# Patient Record
Sex: FEMALE | Race: WHITE | NOT HISPANIC OR LATINO | URBAN - METROPOLITAN AREA
[De-identification: names, ages, dates, MRNs, and addresses within clinical notes are randomized per-mention and may not be internally consistent; named-entity substitution may affect disease eponyms.]

---

## 2023-05-22 ENCOUNTER — PATIENT OUTREACH (OUTPATIENT)
Dept: CARE COORDINATION | Facility: CLINIC | Age: 68
End: 2023-05-22
Payer: MEDICARE

## 2023-07-10 LAB
ANION GAP IN SER/PLAS: 10 MMOL/L (ref 10–20)
CALCIUM (MG/DL) IN SER/PLAS: 9.4 MG/DL (ref 8.6–10.3)
CARBON DIOXIDE, TOTAL (MMOL/L) IN SER/PLAS: 30 MMOL/L (ref 21–32)
CHLORIDE (MMOL/L) IN SER/PLAS: 101 MMOL/L (ref 98–107)
CREATININE (MG/DL) IN SER/PLAS: 0.7 MG/DL (ref 0.5–1.05)
GFR FEMALE: >90 ML/MIN/1.73M2
GLUCOSE (MG/DL) IN SER/PLAS: 95 MG/DL (ref 74–99)
POTASSIUM (MMOL/L) IN SER/PLAS: 3.9 MMOL/L (ref 3.5–5.3)
SODIUM (MMOL/L) IN SER/PLAS: 137 MMOL/L (ref 136–145)
UREA NITROGEN (MG/DL) IN SER/PLAS: 13 MG/DL (ref 6–23)

## 2023-10-11 ENCOUNTER — ANCILLARY PROCEDURE (OUTPATIENT)
Dept: RADIOLOGY | Facility: CLINIC | Age: 68
End: 2023-10-11
Payer: MEDICARE

## 2023-10-11 DIAGNOSIS — Z12.31 ENCOUNTER FOR SCREENING MAMMOGRAM FOR MALIGNANT NEOPLASM OF BREAST: ICD-10-CM

## 2023-10-11 PROCEDURE — 77063 BREAST TOMOSYNTHESIS BI: CPT | Mod: 50

## 2023-10-11 PROCEDURE — 77067 SCR MAMMO BI INCL CAD: CPT | Mod: BILATERAL PROCEDURE | Performed by: RADIOLOGY

## 2023-10-11 PROCEDURE — 77063 BREAST TOMOSYNTHESIS BI: CPT | Mod: BILATERAL PROCEDURE | Performed by: RADIOLOGY

## 2023-10-11 PROCEDURE — 77067 SCR MAMMO BI INCL CAD: CPT

## 2024-05-22 DIAGNOSIS — I48.0 PAROXYSMAL ATRIAL FIBRILLATION (MULTI): ICD-10-CM

## 2024-05-22 RX ORDER — VIT C/E/ZN/COPPR/LUTEIN/ZEAXAN 250MG-90MG
1000 CAPSULE ORAL
COMMUNITY
Start: 2015-09-15

## 2024-05-22 RX ORDER — RIVAROXABAN 20 MG/1
20 TABLET, FILM COATED ORAL DAILY
COMMUNITY

## 2024-05-22 RX ORDER — POTASSIUM CHLORIDE 750 MG/1
10 TABLET, EXTENDED RELEASE ORAL DAILY
COMMUNITY

## 2024-05-22 RX ORDER — POTASSIUM CHLORIDE 750 MG/1
10 TABLET, EXTENDED RELEASE ORAL DAILY
Qty: 90 TABLET | Refills: 3 | Status: SHIPPED | OUTPATIENT
Start: 2024-05-22

## 2024-05-22 RX ORDER — METOPROLOL SUCCINATE 25 MG/1
25 TABLET, EXTENDED RELEASE ORAL DAILY
COMMUNITY

## 2024-05-22 RX ORDER — FLUOROMETHOLONE 1 MG/ML
SUSPENSION/ DROPS OPHTHALMIC
COMMUNITY
Start: 2024-02-26

## 2024-05-22 RX ORDER — BIOTIN 10 MG
TABLET ORAL
COMMUNITY

## 2024-06-19 DIAGNOSIS — I48.0 PAROXYSMAL ATRIAL FIBRILLATION (MULTI): ICD-10-CM

## 2024-06-19 RX ORDER — RIVAROXABAN 20 MG/1
20 TABLET, FILM COATED ORAL DAILY
Qty: 90 TABLET | Refills: 3 | Status: SHIPPED | OUTPATIENT
Start: 2024-06-19

## 2024-07-19 ENCOUNTER — APPOINTMENT (OUTPATIENT)
Dept: CARDIOLOGY | Facility: CLINIC | Age: 69
End: 2024-07-19
Payer: MEDICARE

## 2024-07-19 VITALS
DIASTOLIC BLOOD PRESSURE: 70 MMHG | HEART RATE: 64 BPM | SYSTOLIC BLOOD PRESSURE: 120 MMHG | WEIGHT: 114 LBS | BODY MASS INDEX: 20.2 KG/M2 | HEIGHT: 63 IN

## 2024-07-19 DIAGNOSIS — I48.0 PAROXYSMAL ATRIAL FIBRILLATION (MULTI): Primary | ICD-10-CM

## 2024-07-19 DIAGNOSIS — E87.6 HYPOKALEMIA: ICD-10-CM

## 2024-07-19 PROCEDURE — 99214 OFFICE O/P EST MOD 30 MIN: CPT | Performed by: INTERNAL MEDICINE

## 2024-07-19 PROCEDURE — 1036F TOBACCO NON-USER: CPT | Performed by: INTERNAL MEDICINE

## 2024-07-19 PROCEDURE — 3008F BODY MASS INDEX DOCD: CPT | Performed by: INTERNAL MEDICINE

## 2024-07-19 PROCEDURE — 1159F MED LIST DOCD IN RCRD: CPT | Performed by: INTERNAL MEDICINE

## 2024-07-19 NOTE — PATIENT INSTRUCTIONS

## 2024-07-19 NOTE — PROGRESS NOTES
"Chief Complaint:   Please see below.     History Of Present Illness:    Emeli Mae is a 68 y.o. female presenting with PAF.    This 68 year old woman has anticardiolipin antibody, for which she is on chronic anticoagulation, per hematology. She also has a history of atrial tachycardia, and paroxysmal atrial fibrillation that seems to occur in the setting of hypokalemia. She has a [id number] score of 2 placing her at high risk for stroke and thromboembolism, Her prior stress echo in February 2019 was negative for ischemia by wall motion criteria at 8.5 METS. She has seen an electrophysiologist, Dr. Osvaldo Ruggiero at [facility] most recently in January 2022, at which time continued anticoagulation and conservative management were recommended.     She experiences palpitations rarely, less than once a month, lasting for seconds.  The patient denies chest discomfort, dyspnea,  orthopnea, PND, syncope, and near syncope.  She exercises on a treadmill for an hour, 5-6 days per week, and feels well when she exercises.  The patient denies bleeding problems on anticoagulation.       Last Recorded Vitals:  Vitals:    07/19/24 0900   BP: 120/70   BP Location: Left arm   Patient Position: Sitting   Pulse: 64   Weight: 51.7 kg (114 lb)   Height: 1.6 m (5' 3\")       Past Medical History:  She has a past medical history of Other diseases of the blood and blood-forming organs and certain disorders involving the immune mechanism complicating pregnancy, unspecified trimester ([facility]) (07/09/2021).    Past Surgical History:  She has no past surgical history on file.      Social History:  She reports that she has never smoked. She has never used smokeless tobacco. She reports current alcohol use. She reports that she does not use drugs.    Family History:  Family History   Problem Relation Name Age of Onset    No Known Problems Mother      No Known Problems Father      Hypertrophic cardiomyopathy Child son     Other (ICD) Child son  " "       Allergies:  Patient has no known allergies.    Outpatient Medications:  Current Outpatient Medications   Medication Instructions    BACILLUS COAGULANS-INULIN ORAL 1 capsule, oral, Daily, ( Probiotic )    cholecalciferol (VITAMIN D-3) 1,000 Units, oral, Daily RT    metoprolol succinate XL (TOPROL-XL) 25 mg, oral, Daily    multivitamin,tx-iron-minerals (Complete Multivitamin) tablet oral    potassium chloride CR 10 mEq ER tablet 10 mEq, oral, Daily, Take with food.    Xarelto 20 mg, oral, Daily       Physical Exam:  GENERAL:  pleasant 68 year-old  HEENT: No xanthelasma  NECK: Supple, no palpable adenopathy or thyromegaly  CHEST: Clear to auscultation, respiratory effort unlabored  CARDIAC: RRR, normal S1 and S2, no audible murmur, rub, gallop, carotids are brisk, PMI is not displaced  ABD: Active bowel sounds, nontender, no organomegaly, no evidence of ascites  EXT: No clubbing, cyanosis, edema, or tenderness  NEURO: Awake, alert, appropriate, speech is fluent       Last Labs:  CBC -  Lab Results   Component Value Date    WBC 8.8 05/17/2023    HGB 13.7 05/17/2023    HCT 40.8 05/17/2023    MCV 84 05/17/2023     05/17/2023       CMP -  Lab Results   Component Value Date    CALCIUM 9.4 07/10/2023    PROT 7.2 05/17/2023    ALBUMIN 4.6 05/17/2023    AST 19 05/17/2023    ALT 18 05/17/2023    ALKPHOS 71 05/17/2023    BILITOT 0.4 05/17/2023       LIPID PANEL -   No results found for: \"CHOL\", \"TRIG\", \"HDL\", \"CHHDL\", \"LDLF\", \"VLDL\", \"NHDL\"    RENAL FUNCTION PANEL -   Lab Results   Component Value Date    GLUCOSE 95 07/10/2023     07/10/2023    K 3.9 07/10/2023     07/10/2023    CO2 30 07/10/2023    ANIONGAP 10 07/10/2023    BUN 13 07/10/2023    CREATININE 0.70 07/10/2023    CALCIUM 9.4 07/10/2023    ALBUMIN 4.6 05/17/2023        Lab Results   Component Value Date    BNP 64 05/17/2023    BNP CANCELED 05/17/2023         No recent results to review    Assessment/Plan   Assessment & Plan  Paroxysmal atrial " fibrillation (Multi)  We discussed the things that the patient can do  to avoid future recurrences of atrial fibrillation which include: avoidance of alcohol.  Risk factor modification: educational materials were provided to the patient.   Orders:    Comprehensive metabolic panel; Future    CBC; Future    Follow Up In Cardiology; Future    Hypokalemia  Discussed the correlation between her arrhythmias and hypokalemia, will recheck labs.             Lj Curtis MD

## 2024-07-19 NOTE — ASSESSMENT & PLAN NOTE
We discussed the things that the patient can do  to avoid future recurrences of atrial fibrillation which include: avoidance of alcohol.  Risk factor modification: educational materials were provided to the patient.   Orders:    Comprehensive metabolic panel; Future    CBC; Future    Follow Up In Cardiology; Future

## 2024-07-26 ENCOUNTER — LAB (OUTPATIENT)
Dept: LAB | Facility: LAB | Age: 69
End: 2024-07-26
Payer: MEDICARE

## 2024-07-26 DIAGNOSIS — I48.0 PAROXYSMAL ATRIAL FIBRILLATION (MULTI): ICD-10-CM

## 2024-07-26 LAB
ALBUMIN SERPL BCP-MCNC: 4.5 G/DL (ref 3.4–5)
ALP SERPL-CCNC: 75 U/L (ref 33–136)
ALT SERPL W P-5'-P-CCNC: 21 U/L (ref 7–45)
ANION GAP SERPL CALC-SCNC: 12 MMOL/L (ref 10–20)
AST SERPL W P-5'-P-CCNC: 23 U/L (ref 9–39)
BILIRUB SERPL-MCNC: 0.5 MG/DL (ref 0–1.2)
BUN SERPL-MCNC: 15 MG/DL (ref 6–23)
CALCIUM SERPL-MCNC: 9.4 MG/DL (ref 8.6–10.3)
CHLORIDE SERPL-SCNC: 102 MMOL/L (ref 98–107)
CO2 SERPL-SCNC: 28 MMOL/L (ref 21–32)
CREAT SERPL-MCNC: 0.76 MG/DL (ref 0.5–1.05)
EGFRCR SERPLBLD CKD-EPI 2021: 86 ML/MIN/1.73M*2
ERYTHROCYTE [DISTWIDTH] IN BLOOD BY AUTOMATED COUNT: 12.7 % (ref 11.5–14.5)
GLUCOSE SERPL-MCNC: 110 MG/DL (ref 74–99)
HCT VFR BLD AUTO: 42.2 % (ref 36–46)
HGB BLD-MCNC: 13.7 G/DL (ref 12–16)
MCH RBC QN AUTO: 28 PG (ref 26–34)
MCHC RBC AUTO-ENTMCNC: 32.5 G/DL (ref 32–36)
MCV RBC AUTO: 86 FL (ref 80–100)
NRBC BLD-RTO: 0 /100 WBCS (ref 0–0)
PLATELET # BLD AUTO: 297 X10*3/UL (ref 150–450)
POTASSIUM SERPL-SCNC: 4 MMOL/L (ref 3.5–5.3)
PROT SERPL-MCNC: 6.9 G/DL (ref 6.4–8.2)
RBC # BLD AUTO: 4.89 X10*6/UL (ref 4–5.2)
SODIUM SERPL-SCNC: 138 MMOL/L (ref 136–145)
WBC # BLD AUTO: 6.4 X10*3/UL (ref 4.4–11.3)

## 2024-07-26 PROCEDURE — 80053 COMPREHEN METABOLIC PANEL: CPT

## 2024-07-26 PROCEDURE — 85027 COMPLETE CBC AUTOMATED: CPT

## 2024-07-26 PROCEDURE — 36415 COLL VENOUS BLD VENIPUNCTURE: CPT

## 2024-08-18 DIAGNOSIS — I48.0 PAROXYSMAL ATRIAL FIBRILLATION (MULTI): ICD-10-CM

## 2024-08-19 RX ORDER — METOPROLOL SUCCINATE 25 MG/1
25 TABLET, EXTENDED RELEASE ORAL DAILY
Qty: 90 TABLET | Refills: 3 | Status: SHIPPED | OUTPATIENT
Start: 2024-08-19

## 2024-10-04 ENCOUNTER — APPOINTMENT (OUTPATIENT)
Dept: PRIMARY CARE | Facility: CLINIC | Age: 69
End: 2024-10-04
Payer: MEDICARE

## 2024-10-04 VITALS
WEIGHT: 113.2 LBS | SYSTOLIC BLOOD PRESSURE: 116 MMHG | HEIGHT: 63 IN | DIASTOLIC BLOOD PRESSURE: 70 MMHG | HEART RATE: 61 BPM | BODY MASS INDEX: 20.06 KG/M2 | OXYGEN SATURATION: 97 %

## 2024-10-04 DIAGNOSIS — Z00.00 HEALTH CARE MAINTENANCE: ICD-10-CM

## 2024-10-04 DIAGNOSIS — Z78.0 POST-MENOPAUSAL: ICD-10-CM

## 2024-10-04 DIAGNOSIS — D68.61 ANTIPHOSPHOLIPID SYNDROME (MULTI): ICD-10-CM

## 2024-10-04 DIAGNOSIS — I48.91 ATRIAL FIBRILLATION, UNSPECIFIED TYPE (MULTI): Primary | ICD-10-CM

## 2024-10-04 DIAGNOSIS — Z12.31 ENCOUNTER FOR SCREENING MAMMOGRAM FOR MALIGNANT NEOPLASM OF BREAST: ICD-10-CM

## 2024-10-04 PROCEDURE — 99214 OFFICE O/P EST MOD 30 MIN: CPT | Performed by: INTERNAL MEDICINE

## 2024-10-04 PROCEDURE — 3008F BODY MASS INDEX DOCD: CPT | Performed by: INTERNAL MEDICINE

## 2024-10-04 PROCEDURE — 1159F MED LIST DOCD IN RCRD: CPT | Performed by: INTERNAL MEDICINE

## 2024-10-04 ASSESSMENT — PATIENT HEALTH QUESTIONNAIRE - PHQ9
SUM OF ALL RESPONSES TO PHQ9 QUESTIONS 1 AND 2: 0
1. LITTLE INTEREST OR PLEASURE IN DOING THINGS: NOT AT ALL
2. FEELING DOWN, DEPRESSED OR HOPELESS: NOT AT ALL

## 2024-10-04 NOTE — PROGRESS NOTES
"Subjective   Patient ID: [id number]     Emeli Mae is a 69 y.o. female who presents for New Patient Visit.    Current Outpatient Medications:     BACILLUS COAGULANS-INULIN ORAL, Take 1 capsule by mouth once daily. ( Probiotic ), Disp: , Rfl:     cholecalciferol (Vitamin D-3) 25 MCG (1000 UT) capsule, Take 1 capsule (25 mcg) by mouth once daily., Disp: , Rfl:     metoprolol succinate XL (Toprol-XL) 25 mg 24 hr tablet, TAKE 1 TABLET BY MOUTH EVERY DAY, Disp: 90 tablet, Rfl: 3    multivitamin,tx-iron-minerals (Complete Multivitamin) tablet, Take by mouth., Disp: , Rfl:     potassium chloride CR 10 mEq ER tablet, TAKE 1 TABLET BY MOUTH EVERY DAY WITH FOOD, Disp: 90 tablet, Rfl: 3    Xarelto 20 mg tablet, TAKE 1 TABLET BY MOUTH EVERY DAY, Disp: 90 tablet, Rfl: 3  HPI  69-year-old patient presented to the office today to establish care.  Patient will need to schedule her annual Medicare wellness visit and annual exam in the near future.  Patient is known to have history of atrial fibrillation actively followed by cardiology rate controlled and anticoagulated she feels great she has no specific complaints or concerns today.  Review of system was reviewed all normal except what is noted in HPI   Past Medical History:   Diagnosis Date    Other diseases of the blood and blood-forming organs and certain disorders involving the immune mechanism complicating pregnancy, unspecified trimester ([facility]) 07/09/2021    Antiphospholipid syndrome in pregnancy      Objective   /70 (BP Location: Left arm, Patient Position: Sitting, BP Cuff Size: Adult)   Pulse 61   Ht 1.6 m (5' 3\")   Wt 51.3 kg (113 lb 3.2 oz)   SpO2 97%   BMI 20.05 kg/m²      Physical Exam  Constitutional:       Appearance: Normal appearance.   Cardiovascular:      Rate and Rhythm: Normal rate and regular rhythm.      Pulses: Normal pulses.      Heart sounds: Normal heart sounds.   Pulmonary:      Effort: Pulmonary effort is normal.      Breath sounds: " Normal breath sounds.   Neurological:      Mental Status: She is alert.         Assessment/Plan   Problem List Items Addressed This Visit    None  Visit Diagnoses       Encounter for screening mammogram for malignant neoplasm of breast        Relevant Orders    BI mammo bilateral screening tomosynthesis          69-year-old patient with the following issues.    1.  Atrial fibrillation currently rate controlled and anticoagulated the plan is to continue no changes.    2.  I will see the patient back in the office for her annual exam and Medicare wellness visit and sooner if needed.  Lab work is requested and mammogram and DEXA scan are requested.    No other active issues or concerns during this visit.  Becky Rouse MD

## 2024-10-08 ENCOUNTER — APPOINTMENT (OUTPATIENT)
Dept: RADIOLOGY | Facility: CLINIC | Age: 69
End: 2024-10-08
Payer: MEDICARE

## 2024-10-10 ENCOUNTER — APPOINTMENT (OUTPATIENT)
Dept: RADIOLOGY | Facility: CLINIC | Age: 69
End: 2024-10-10
Payer: MEDICARE

## 2024-10-17 ENCOUNTER — HOSPITAL ENCOUNTER (OUTPATIENT)
Dept: RADIOLOGY | Facility: CLINIC | Age: 69
Discharge: HOME | End: 2024-10-17
Payer: MEDICARE

## 2024-10-17 DIAGNOSIS — Z78.0 POST-MENOPAUSAL: ICD-10-CM

## 2024-10-17 PROCEDURE — 77080 DXA BONE DENSITY AXIAL: CPT

## 2024-10-22 ENCOUNTER — TELEPHONE (OUTPATIENT)
Dept: PRIMARY CARE | Facility: CLINIC | Age: 69
End: 2024-10-22
Payer: MEDICARE

## 2024-10-22 NOTE — TELEPHONE ENCOUNTER
Pt needs to see if Dr Bryant would like to add her on for a sooner appt. She has her mcw set up for 1/31/25. Please advise if pt needs something sooner per Dr Lau's msg on her Dexa Scan results. Thank you!

## 2024-10-22 NOTE — TELEPHONE ENCOUNTER
----- Message from Kenneth Lau sent at 10/22/2024  1:02 PM EDT -----  Please call patient to set up appointment with Dr. Bryant so to discuss bone density results

## 2024-11-07 ENCOUNTER — HOSPITAL ENCOUNTER (OUTPATIENT)
Dept: RADIOLOGY | Facility: CLINIC | Age: 69
Discharge: HOME | End: 2024-11-07
Payer: MEDICARE

## 2024-11-07 VITALS — BODY MASS INDEX: 19.49 KG/M2 | WEIGHT: 110 LBS | HEIGHT: 63 IN

## 2024-11-07 DIAGNOSIS — Z12.31 ENCOUNTER FOR SCREENING MAMMOGRAM FOR MALIGNANT NEOPLASM OF BREAST: ICD-10-CM

## 2024-11-07 PROCEDURE — 77067 SCR MAMMO BI INCL CAD: CPT

## 2024-11-08 ENCOUNTER — APPOINTMENT (OUTPATIENT)
Dept: PRIMARY CARE | Facility: CLINIC | Age: 69
End: 2024-11-08
Payer: MEDICARE

## 2024-11-08 VITALS
HEIGHT: 63 IN | OXYGEN SATURATION: 95 % | HEART RATE: 62 BPM | WEIGHT: 113.6 LBS | SYSTOLIC BLOOD PRESSURE: 120 MMHG | DIASTOLIC BLOOD PRESSURE: 80 MMHG | BODY MASS INDEX: 20.13 KG/M2

## 2024-11-08 DIAGNOSIS — M81.0 AGE-RELATED OSTEOPOROSIS WITHOUT CURRENT PATHOLOGICAL FRACTURE: Primary | ICD-10-CM

## 2024-11-08 PROCEDURE — 99213 OFFICE O/P EST LOW 20 MIN: CPT | Performed by: INTERNAL MEDICINE

## 2024-11-08 PROCEDURE — G2211 COMPLEX E/M VISIT ADD ON: HCPCS | Performed by: INTERNAL MEDICINE

## 2024-11-08 PROCEDURE — 1159F MED LIST DOCD IN RCRD: CPT | Performed by: INTERNAL MEDICINE

## 2024-11-08 PROCEDURE — 3008F BODY MASS INDEX DOCD: CPT | Performed by: INTERNAL MEDICINE

## 2024-11-08 ASSESSMENT — PATIENT HEALTH QUESTIONNAIRE - PHQ9
2. FEELING DOWN, DEPRESSED OR HOPELESS: NOT AT ALL
1. LITTLE INTEREST OR PLEASURE IN DOING THINGS: NOT AT ALL
SUM OF ALL RESPONSES TO PHQ9 QUESTIONS 1 AND 2: 0

## 2024-11-08 NOTE — PROGRESS NOTES
"Subjective   Patient ID: [id number]     Emeli Mae is a 69 y.o. female who presents for Results (Review bone density results).    Current Outpatient Medications:     BACILLUS COAGULANS-INULIN ORAL, Take 1 capsule by mouth once daily. ( Probiotic ), Disp: , Rfl:     cholecalciferol (Vitamin D-3) 25 MCG (1000 UT) capsule, Take 1 capsule (25 mcg) by mouth once daily., Disp: , Rfl:     metoprolol succinate XL (Toprol-XL) 25 mg 24 hr tablet, TAKE 1 TABLET BY MOUTH EVERY DAY, Disp: 90 tablet, Rfl: 3    multivitamin,tx-iron-minerals (Complete Multivitamin) tablet, Take by mouth., Disp: , Rfl:     potassium chloride CR 10 mEq ER tablet, TAKE 1 TABLET BY MOUTH EVERY DAY WITH FOOD, Disp: 90 tablet, Rfl: 3    Xarelto 20 mg tablet, TAKE 1 TABLET BY MOUTH EVERY DAY, Disp: 90 tablet, Rfl: 3  HPI  69-year-old patient who presented to the office today for follow-up on her bone mineral density and to discuss results.  She has no complaints or concerns.  Review of system was reviewed all normal except what is noted in HPI   Past Medical History:   Diagnosis Date    Other diseases of the blood and blood-forming organs and certain disorders involving the immune mechanism complicating pregnancy, unspecified trimester ([facility]) 07/09/2021    Antiphospholipid syndrome in pregnancy      Objective   /80 (BP Location: Left arm, Patient Position: Sitting, BP Cuff Size: Adult)   Pulse 62   Ht 1.6 m (5' 3\")   Wt 51.5 kg (113 lb 9.6 oz)   SpO2 95%   BMI 20.12 kg/m²      Physical Exam  Constitutional:       Appearance: Normal appearance.   Cardiovascular:      Rate and Rhythm: Normal rate and regular rhythm.      Pulses: Normal pulses.      Heart sounds: Normal heart sounds.   Pulmonary:      Effort: Pulmonary effort is normal.      Breath sounds: Normal breath sounds.   Neurological:      Mental Status: She is alert.         Assessment/Plan   Problem List Items Addressed This Visit    None  Visit Diagnoses       Age-related " osteoporosis without current pathological fracture    -  Primary    Relevant Orders    Referral to Rheumatology          69-year-old patient with the following issues.    1.  Evidence of osteoporosis on bone mineral density.  Today we did discuss the importance of taking calcium and vitamin D supplement to meet the dose of requirement of 1200 of calcium and 1000 international unit of vitamin D along with bisphosphonates we did discuss the use of Fosamax once weekly.  We went over the side effects and the contraindications which she does not have.  Patient was a little bit hesitant to start medications and she wanted to discuss this further with the specialist referral to rheumatology was provided.    Patient was encouraged to continue with exercise and core strengthening exercises and walking to improve her bone health.    All her questions were addressed no other active issues or concerns during this visit.  Becky Rouse MD

## 2025-01-07 ENCOUNTER — APPOINTMENT (OUTPATIENT)
Dept: RHEUMATOLOGY | Facility: CLINIC | Age: 70
End: 2025-01-07
Payer: MEDICARE

## 2025-01-31 ENCOUNTER — APPOINTMENT (OUTPATIENT)
Dept: PRIMARY CARE | Facility: CLINIC | Age: 70
End: 2025-01-31
Payer: MEDICARE

## 2025-02-06 ENCOUNTER — APPOINTMENT (OUTPATIENT)
Dept: RHEUMATOLOGY | Facility: CLINIC | Age: 70
End: 2025-02-06
Payer: MEDICARE

## 2025-02-27 ENCOUNTER — APPOINTMENT (OUTPATIENT)
Dept: OPHTHALMOLOGY | Facility: CLINIC | Age: 70
End: 2025-02-27
Payer: MEDICARE

## 2025-03-07 ENCOUNTER — APPOINTMENT (OUTPATIENT)
Dept: PRIMARY CARE | Facility: CLINIC | Age: 70
End: 2025-03-07
Payer: MEDICARE

## 2025-03-10 ENCOUNTER — OFFICE VISIT (OUTPATIENT)
Dept: URGENT CARE | Age: 70
End: 2025-03-10
Payer: MEDICARE

## 2025-03-10 DIAGNOSIS — I48.0 PAROXYSMAL ATRIAL FIBRILLATION (MULTI): ICD-10-CM

## 2025-03-10 DIAGNOSIS — L30.9 ECZEMA OF FACE: Primary | ICD-10-CM

## 2025-03-10 DIAGNOSIS — E87.6 HYPOKALEMIA: Primary | ICD-10-CM

## 2025-03-10 PROCEDURE — 99204 OFFICE O/P NEW MOD 45 MIN: CPT | Performed by: NURSE PRACTITIONER

## 2025-03-10 PROCEDURE — 1036F TOBACCO NON-USER: CPT | Performed by: NURSE PRACTITIONER

## 2025-03-10 PROCEDURE — 1159F MED LIST DOCD IN RCRD: CPT | Performed by: NURSE PRACTITIONER

## 2025-03-10 PROCEDURE — 1160F RVW MEDS BY RX/DR IN RCRD: CPT | Performed by: NURSE PRACTITIONER

## 2025-03-10 RX ORDER — TRIAMCINOLONE ACETONIDE 1 MG/G
OINTMENT TOPICAL
Qty: 80 G | Refills: 0 | Status: SHIPPED | OUTPATIENT
Start: 2025-03-10

## 2025-03-10 ASSESSMENT — ENCOUNTER SYMPTOMS
CONSTITUTIONAL NEGATIVE: 1
GASTROINTESTINAL NEGATIVE: 1
NEUROLOGICAL NEGATIVE: 1
PSYCHIATRIC NEGATIVE: 1
CARDIOVASCULAR NEGATIVE: 1
EYES NEGATIVE: 1
ALLERGIC/IMMUNOLOGIC NEGATIVE: 1
RESPIRATORY NEGATIVE: 1
ENDOCRINE NEGATIVE: 1
MUSCULOSKELETAL NEGATIVE: 1
HEMATOLOGIC/LYMPHATIC NEGATIVE: 1

## 2025-03-10 NOTE — PROGRESS NOTES
Urgent Care Virtual Video Visit    Patient Location: [facility]   Patient ID: Emeli Mae is a 69 y.o. female. They present today with a chief complaint of No chief complaint on file..    History of Present Illness  Patient is a 70 y/o female presenting for virtual examination with c/o facial eczema flare-up x1 day. Patient denies use of new soaps/conditions/shampoos/detergents or ingestion of new foods/drinks. Patient denies symptoms of facial/lip/glossal/throat swelling, fever, chills, bodyaches, lethargy, weakness, chest pain/tightness/pressure, SOB, wheezing, N/V/D, ABD pain. No OTC medication reported to be taken.           Past Medical History  Allergies as of 03/10/2025    (No Known Allergies)       (Not in a hospital admission)       Past Medical History:   Diagnosis Date    Other diseases of the blood and blood-forming organs and certain disorders involving the immune mechanism complicating pregnancy, unspecified trimester ([facility]) 07/09/2021    Antiphospholipid syndrome in pregnancy       History reviewed. No pertinent surgical history.     reports that she has never smoked. She has never used smokeless tobacco. She reports current alcohol use. She reports that she does not use drugs.    Review of Systems  Review of Systems   Constitutional: Negative.    HENT: Negative.     Eyes: Negative.    Respiratory: Negative.     Cardiovascular: Negative.    Gastrointestinal: Negative.    Endocrine: Negative.    Genitourinary: Negative.    Musculoskeletal: Negative.    Skin:  Positive for rash.   Allergic/Immunologic: Negative.    Neurological: Negative.    Hematological: Negative.    Psychiatric/Behavioral: Negative.                                    Objective    There were no vitals filed for this visit.  No LMP recorded. Patient is postmenopausal.    Physical Exam  Constitutional:       Comments: Patient A/O x4, LOC 5, calm and cooperative. Patient speaking in complete sentences and following commands  appropriately. Patient in no acute distress during virtual examination   HENT:      Head: Normocephalic and atraumatic.      Nose: Nose normal.   Eyes:      Extraocular Movements: Extraocular movements intact.      Conjunctiva/sclera: Conjunctivae normal.      Pupils: Pupils are equal, round, and reactive to light.   Pulmonary:      Comments: No audible cough during examination. Patient speaking in complete sentences without SOB or difficulty. Patient in no acute respiratory distress   Skin:     Comments: Multiple erythematous patches of dry-flaking tissue present to bilateral cheek regions. Patient reports no open tissue, pustules, exudates. No tenderness or warmth to palpation. All other visible skin intact   Neurological:      General: No focal deficit present.      Mental Status: She is oriented to person, place, and time.   Psychiatric:         Mood and Affect: Mood normal.         Behavior: Behavior normal.         Procedures    Point of Care Test & Imaging Results from this visit  No results found for this visit on 03/10/25.   No results found.    Diagnostic study results (if any) were reviewed by STEVEN Bryan.    Assessment/Plan   Allergies, medications, history, and pertinent labs/EKGs/Imaging reviewed by STEVEN Bryan.     Medical Decision Making  -Patient presenting to virtual examination with eczema flare-up to bilateral cheeks.   -Discussed risks vs benefits of topical steroid application to face; patient reports she is willing to try  -Emmollient encouraged  -Will refer patient to  dermatology for follow up and further treatment  -Discussed virtual examination limitations along with red flag s/s to which would dictate ED evaluation  -Stable upon discharge    I have communicated my name and active licensure information to the patient. The patient's identity and physical location were verified at the time of this visit. Either the patient or their legal representative were informed of  the risks and benefits of, and alternatives to, treatment through a remote evaluation. The patient consented to proceed with the evaluation remotely. This remote visit was conducted using video and audio.     At time of discharge, patient was clinically well-appearing and appropriate for outpatient management. The patient/parent/guardian was educated regarding diagnosis, supportive care, OTC and Rx medications. The patient/parent/guardian was given the opportunity to ask questions prior to discharge. They verbalized understanding of discussion of treatment plan, expected course of illness and/or injury, indications on when to return to , when to seek further evaluation in ED/call 911, and the need to follow up with PCP and/or specialist as referred. Patient/parent/guardian was provided with work/school documentation if requested. Patient stable upon discharge.     Orders and Diagnoses  Diagnoses and all orders for this visit:  Eczema of face  -     triamcinolone (Kenalog) 0.1 % ointment; Apply a thin film topically to affected skin areas twice daily as needed      Medical Admin Record      Patient disposition: Home    Electronically signed by STEVEN Bryan  12:16 PM      Provider Location: [facility]    Video visit completed with realtime synchronous video/audio connection. Informed consent was obtained from the patient. Patient was made aware that my evaluation and diagnosis are limited due to the fact that we are not in the same room during the interview and that this is a virtual encounter that took place via videoconferencing. Patient verbalized understanding.     Patient disposition: Home    Electronically signed by STEVEN Bryan  12:16 PM

## 2025-03-11 RX ORDER — POTASSIUM CHLORIDE 750 MG/1
10 TABLET, EXTENDED RELEASE ORAL DAILY
Qty: 90 TABLET | Refills: 3 | Status: SHIPPED | OUTPATIENT
Start: 2025-03-11

## 2025-04-01 ENCOUNTER — APPOINTMENT (OUTPATIENT)
Dept: CARDIOLOGY | Facility: CLINIC | Age: 70
End: 2025-04-01
Payer: MEDICARE

## 2025-04-01 VITALS
HEIGHT: 63 IN | DIASTOLIC BLOOD PRESSURE: 62 MMHG | BODY MASS INDEX: 19.45 KG/M2 | OXYGEN SATURATION: 96 % | WEIGHT: 109.8 LBS | SYSTOLIC BLOOD PRESSURE: 122 MMHG | HEART RATE: 80 BPM

## 2025-04-01 DIAGNOSIS — I48.0 PAROXYSMAL ATRIAL FIBRILLATION (MULTI): Primary | ICD-10-CM

## 2025-04-01 DIAGNOSIS — R00.2 PALPITATIONS: ICD-10-CM

## 2025-04-01 PROCEDURE — 1160F RVW MEDS BY RX/DR IN RCRD: CPT | Performed by: INTERNAL MEDICINE

## 2025-04-01 PROCEDURE — 99214 OFFICE O/P EST MOD 30 MIN: CPT | Performed by: INTERNAL MEDICINE

## 2025-04-01 PROCEDURE — G2211 COMPLEX E/M VISIT ADD ON: HCPCS | Performed by: INTERNAL MEDICINE

## 2025-04-01 PROCEDURE — 3008F BODY MASS INDEX DOCD: CPT | Performed by: INTERNAL MEDICINE

## 2025-04-01 PROCEDURE — 1159F MED LIST DOCD IN RCRD: CPT | Performed by: INTERNAL MEDICINE

## 2025-04-01 PROCEDURE — 1036F TOBACCO NON-USER: CPT | Performed by: INTERNAL MEDICINE

## 2025-04-01 PROCEDURE — 93000 ELECTROCARDIOGRAM COMPLETE: CPT | Performed by: INTERNAL MEDICINE

## 2025-04-01 NOTE — PATIENT INSTRUCTIONS

## 2025-04-01 NOTE — ASSESSMENT & PLAN NOTE
Orders:    Holter Or Event Cardiac Monitor; Future    TSH with reflex to Free T4 if abnormal; Future    Follow Up In Cardiology; Future

## 2025-04-01 NOTE — ASSESSMENT & PLAN NOTE
Orders:    ECG 12 lead (Clinic Performed)    Transthoracic Echo Complete; Future    Holter Or Event Cardiac Monitor; Future    Basic metabolic panel; Future    Magnesium; Future    Follow Up In Cardiology; Future

## 2025-04-01 NOTE — PROGRESS NOTES
"Chief Complaint:   Please See Below     History Of Present Illness:    Emeli Mea is a 69 y.o. female presenting with palpitations, paroxysmal atrial fibrillation.    This 69year old woman has anticardiolipin antibody, for which she is on chronic anticoagulation, per hematology. She also has a history of atrial tachycardia, and paroxysmal atrial fibrillation that seems to occur in the setting of hypokalemia. She has a [id number]-PGIi score of 2 placing her at high risk for stroke and thromboembolism, Her prior stress echo in February 2019 was negative for ischemia by wall motion criteria at 8.5 METS. She has seen an electrophysiologist, Dr. Osvaldo Ruggiero at [facility] most recently in January 2022, at which time continued anticoagulation and conservative management were recommended.     Two weeks ago, while visiting family in [place] under stressful circumstances, she experienced palpitations in the middle of the night.  She felt shaky, and noticed her heart was beating irregularly.  These symptoms lasted for about a week before resolving.  She did not seek medical attention at the time.  The patient denies chest discomfort, dyspnea,  orthopnea, PND, syncope, and near syncope.      She was consuming 1-2 coffees poer day, but stopped.  Denies alcohol.           Last Recorded Vitals:  Vitals:    04/01/25 1149   BP: 122/62   BP Location: Left arm   Patient Position: Sitting   Pulse: 80   SpO2: 96%   Weight: 49.8 kg (109 lb 12.8 oz)   Height: 1.6 m (5' 3\")       Past Medical History:  She has a past medical history of Other diseases of the blood and blood-forming organs and certain disorders involving the immune mechanism complicating pregnancy, unspecified trimester ([facility]) (07/09/2021).    Past Surgical History:  She has no past surgical history on file.      Social History:  She reports that she has never smoked. She has never used smokeless tobacco. She reports that she does not currently use alcohol. She " "reports that she does not use drugs.    Family History:  Family History   Problem Relation Name Age of Onset    No Known Problems Mother      No Known Problems Father      Hypertrophic cardiomyopathy Child son     Other (ICD) Child son         Allergies:  Patient has no known allergies.    Outpatient Medications:  Current Outpatient Medications   Medication Instructions    BACILLUS COAGULANS-INULIN ORAL 1 capsule, Daily    cholecalciferol (VITAMIN D-3) 1,000 Units, Daily RT    metoprolol succinate XL (TOPROL-XL) 25 mg, oral, Daily    multivitamin,tx-iron-minerals (Complete Multivitamin) tablet Take by mouth.    potassium chloride CR 10 mEq ER tablet 10 mEq, oral, Daily, Take with food.    triamcinolone (Kenalog) 0.1 % ointment Apply a thin film topically to affected skin areas twice daily as needed    Xarelto 20 mg, oral, Daily       Physical Exam:  GENERAL:  pleasant 69 year-old  HEENT: No xanthelasma  NECK: Supple, no palpable adenopathy or thyromegaly  CHEST: Clear to auscultation, respiratory effort unlabored  CARDIAC: RRR, normal S1 and S2, no audible murmur, rub, gallop, carotids are brisk, PMI is not displaced  ABD: Active bowel sounds, nontender, no organomegaly, no evidence of ascites  EXT: No clubbing, cyanosis, edema, or tenderness  NEURO: Awake, alert, appropriate, speech is fluent       Last Labs:  CBC -  Lab Results   Component Value Date    WBC 6.4 07/26/2024    HGB 13.7 07/26/2024    HCT 42.2 07/26/2024    MCV 86 07/26/2024     07/26/2024       CMP -  Lab Results   Component Value Date    CALCIUM 9.4 07/26/2024    PROT 6.9 07/26/2024    ALBUMIN 4.5 07/26/2024    AST 23 07/26/2024    ALT 21 07/26/2024    ALKPHOS 75 07/26/2024    BILITOT 0.5 07/26/2024       LIPID PANEL -   No results found for: \"CHOL\", \"TRIG\", \"HDL\", \"CHHDL\", \"LDLF\", \"VLDL\", \"NHDL\"    RENAL FUNCTION PANEL -   Lab Results   Component Value Date    GLUCOSE 110 (H) 07/26/2024     07/26/2024    K 4.0 07/26/2024     " 07/26/2024    CO2 28 07/26/2024    ANIONGAP 12 07/26/2024    BUN 15 07/26/2024    CREATININE 0.76 07/26/2024    CALCIUM 9.4 07/26/2024    ALBUMIN 4.5 07/26/2024        Lab Results   Component Value Date    BNP 64 05/17/2023    BNP CANCELED 05/17/2023         No recent results to review    Assessment/Plan   Assessment & Plan  Paroxysmal atrial fibrillation (Multi)    Orders:    ECG 12 lead (Clinic Performed)    Transthoracic Echo Complete; Future    Holter Or Event Cardiac Monitor; Future    Basic metabolic panel; Future    Magnesium; Future    Follow Up In Cardiology; Future    Palpitations    Orders:    Holter Or Event Cardiac Monitor; Future    TSH with reflex to Free T4 if abnormal; Future    Follow Up In Cardiology; Future          Lj Curtis MD

## 2025-04-10 ENCOUNTER — APPOINTMENT (OUTPATIENT)
Dept: PRIMARY CARE | Facility: CLINIC | Age: 70
End: 2025-04-10
Payer: MEDICARE

## 2025-04-11 ENCOUNTER — TELEPHONE (OUTPATIENT)
Dept: CARDIOLOGY | Facility: CLINIC | Age: 70
End: 2025-04-11
Payer: MEDICARE

## 2025-04-22 ENCOUNTER — APPOINTMENT (OUTPATIENT)
Dept: CARDIOLOGY | Facility: CLINIC | Age: 70
End: 2025-04-22
Payer: MEDICARE

## 2025-04-24 ENCOUNTER — APPOINTMENT (OUTPATIENT)
Dept: CARDIOLOGY | Facility: HOSPITAL | Age: 70
End: 2025-04-24
Payer: MEDICARE

## 2025-04-24 ENCOUNTER — HOSPITAL ENCOUNTER (OUTPATIENT)
Dept: CARDIOLOGY | Facility: HOSPITAL | Age: 70
Discharge: HOME | End: 2025-04-24
Payer: MEDICARE

## 2025-04-24 ENCOUNTER — APPOINTMENT (OUTPATIENT)
Dept: OPHTHALMOLOGY | Facility: CLINIC | Age: 70
End: 2025-04-24
Payer: MEDICARE

## 2025-04-24 DIAGNOSIS — I48.0 PAROXYSMAL ATRIAL FIBRILLATION (MULTI): ICD-10-CM

## 2025-04-24 DIAGNOSIS — H25.13 NUCLEAR SENILE CATARACT OF BOTH EYES: ICD-10-CM

## 2025-04-24 DIAGNOSIS — H10.13 ALLERGIC CONJUNCTIVITIS OF BOTH EYES: Primary | ICD-10-CM

## 2025-04-24 DIAGNOSIS — H11.002 PTERYGIUM OF LEFT EYE: ICD-10-CM

## 2025-04-24 DIAGNOSIS — R00.2 PALPITATIONS: ICD-10-CM

## 2025-04-24 DIAGNOSIS — H52.223 REGULAR ASTIGMATISM OF BOTH EYES: ICD-10-CM

## 2025-04-24 DIAGNOSIS — H52.13 MYOPIA, BILATERAL: ICD-10-CM

## 2025-04-24 PROCEDURE — 99204 OFFICE O/P NEW MOD 45 MIN: CPT | Performed by: OPTOMETRIST

## 2025-04-24 PROCEDURE — 92015 DETERMINE REFRACTIVE STATE: CPT | Performed by: OPTOMETRIST

## 2025-04-24 PROCEDURE — 93270 REMOTE 30 DAY ECG REV/REPORT: CPT

## 2025-04-24 PROCEDURE — FLVLG CONTACT LENS EVAL - LEVEL 2 (SP): Performed by: OPTOMETRIST

## 2025-04-24 RX ORDER — OLOPATADINE HYDROCHLORIDE 2 MG/ML
1 SOLUTION OPHTHALMIC DAILY
Qty: 2.5 ML | Refills: 5 | Status: SHIPPED | OUTPATIENT
Start: 2025-04-24 | End: 2025-10-21

## 2025-04-24 ASSESSMENT — REFRACTION_CURRENTRX
OD_SPHERE: -2.50
OD_DIAMETER: 14.5
OD_BRAND: BIOFINITY TORIC
OD_BASECURVE: 8.7
OD_BASECURVE: 8.5
OD_DIAMETER: 14.5
OD_AXIS: 180
OD_SPHERE: -2.75
OD_CYLINDER: -1.25
OD_CYLINDER: -1.25
OD_AXIS: 180
OD_CYLINDER: -0.75
OD_DIAMETER: 14.5
OD_BASECURVE: 8.5
OD_SPHERE: -2.75
OD_BRAND: ACUVUE 1 DAY MOIST FOR ASTIGMATISM
OD_AXIS: 180
OD_BRAND: ACUVUE 1 DAY MOIST FOR ASTIGMATISM

## 2025-04-24 ASSESSMENT — REFRACTION_WEARINGRX
OS_ADD: +2.50
OS_SPHERE: -1.75
OS_AXIS: 165
OD_SPHERE: -3.00
OS_CYLINDER: -1.00
OD_AXIS: 170
OD_ADD: +2.50
OD_CYLINDER: -1.25

## 2025-04-24 ASSESSMENT — CUP TO DISC RATIO
OD_RATIO: 0.3
OS_RATIO: 0.3

## 2025-04-24 ASSESSMENT — CONF VISUAL FIELD
OD_SUPERIOR_NASAL_RESTRICTION: 0
METHOD: COUNTING FINGERS
OS_NORMAL: 1
OD_INFERIOR_NASAL_RESTRICTION: 0
OD_SUPERIOR_TEMPORAL_RESTRICTION: 0
OS_INFERIOR_NASAL_RESTRICTION: 0
OD_NORMAL: 1
OS_INFERIOR_TEMPORAL_RESTRICTION: 0
OS_SUPERIOR_NASAL_RESTRICTION: 0
OS_SUPERIOR_TEMPORAL_RESTRICTION: 0
OD_INFERIOR_TEMPORAL_RESTRICTION: 0

## 2025-04-24 ASSESSMENT — TONOMETRY
OD_IOP_MMHG: 15
IOP_METHOD: GOLDMANN APPLANATION
OS_IOP_MMHG: 15

## 2025-04-24 ASSESSMENT — ENCOUNTER SYMPTOMS
PSYCHIATRIC NEGATIVE: 0
EYES NEGATIVE: 0
HEMATOLOGIC/LYMPHATIC NEGATIVE: 0
ALLERGIC/IMMUNOLOGIC NEGATIVE: 0
GASTROINTESTINAL NEGATIVE: 0
MUSCULOSKELETAL NEGATIVE: 0
CARDIOVASCULAR NEGATIVE: 1
RESPIRATORY NEGATIVE: 0
CONSTITUTIONAL NEGATIVE: 0
ENDOCRINE NEGATIVE: 0
NEUROLOGICAL NEGATIVE: 0

## 2025-04-24 ASSESSMENT — REFRACTION_MANIFEST
OD_ADD: +2.50
OD_CYLINDER: -1.50
OS_ADD: +2.50
OS_SPHERE: -1.75
OD_SPHERE: -3.00
OS_AXIS: 170
OS_CYLINDER: -1.00
OD_AXIS: 180

## 2025-04-24 ASSESSMENT — VISUAL ACUITY
VA_OR_OD_CURRENT_RX: 20/25-1
VA_OR_OD_CURRENT_RX: 20/30-1
CORRECTION_TYPE: CONTACTS
OS_PH_SC: 20/30
METHOD: SNELLEN - LINEAR
OD_CC+: +2
OD_CC: 20/40
OS_SC: 20/20-2
OS_SC: 20/200

## 2025-04-24 ASSESSMENT — EXTERNAL EXAM - RIGHT EYE: OD_EXAM: NORMAL

## 2025-04-24 ASSESSMENT — SLIT LAMP EXAM - LIDS
COMMENTS: GOOD POSITION
COMMENTS: GOOD POSITION

## 2025-04-24 ASSESSMENT — EXTERNAL EXAM - LEFT EYE: OS_EXAM: NORMAL

## 2025-04-24 NOTE — PROGRESS NOTES
Assessment/Plan   Diagnoses and all orders for this visit:  Allergic conjunctivitis of both eyes  Recommend alaway/zaditor/pataday/lastacaft gtts qday-bid OU. Consider steroid drops (gtts) if worsening. Recommend cold compresses, cold ATs for comfort. No eye rubbing. Recommend addition of oral allergy med to help with control. Monitor prn.     Nuclear senile cataract of both eyes  Patient's cataracts are not visually significant. Will monitor for changes. No indication for surgery at this time.     Pterygium of left eye  Pterygium noted in temporal aspect of left cornea. 1 mm encroachment onto cornea. Not impeding vision and pt is asymptomatic.   Recommend OTC PF ATs use if area around pterygium becomes irritated or dry.   Monitor in 1 year with CEX.     Myopia, bilateral  Regular astigmatism of both eyes  New patient, stable refractive error, issued spec rx for full-time wear, reinforced importance. Ocular structures and alignment otherwise normal. RTC 1yr or sooner if any complications arise.   Pt is monovision, OD for distance OS for near. Trailed acuvue 1 day moist for astigmatism with good comfort, fit, and vision. Will send trials to pt.

## 2025-04-24 NOTE — Clinical Note
Both eyes (OU) Contact Lens Order Contact Lens Current Rx      Current Contact Lens Rx #3 (Trial Lens, Ordered)      Brand Base Curve Diameter Sphere Cylinder [place] Dist [place] Centration   Right Acuvue 1 Day Moist for Astigmatism 8.5 14.5 -2.75 -1.25 180          Quantity: 2 Package: TRIAL Appointment needed? No Medically necessary? No Ship To: Home Additional instructions: please order 2 trial packs and mail to patient. Thanks!

## 2025-04-25 LAB
ANION GAP SERPL CALCULATED.4IONS-SCNC: 7 MMOL/L (CALC) (ref 7–17)
BUN SERPL-MCNC: 17 MG/DL (ref 7–25)
BUN/CREAT SERPL: NORMAL (CALC) (ref 6–22)
CALCIUM SERPL-MCNC: 9.4 MG/DL (ref 8.6–10.4)
CHLORIDE SERPL-SCNC: 103 MMOL/L (ref 98–110)
CO2 SERPL-SCNC: 29 MMOL/L (ref 20–32)
CREAT SERPL-MCNC: 0.69 MG/DL (ref 0.5–1.05)
EGFRCR SERPLBLD CKD-EPI 2021: 94 ML/MIN/1.73M2
GLUCOSE SERPL-MCNC: 89 MG/DL (ref 65–139)
MAGNESIUM SERPL-MCNC: 2.2 MG/DL (ref 1.5–2.5)
POTASSIUM SERPL-SCNC: 4.3 MMOL/L (ref 3.5–5.3)
SODIUM SERPL-SCNC: 139 MMOL/L (ref 135–146)
TSH SERPL-ACNC: 1.35 MIU/L (ref 0.4–4.5)

## 2025-05-01 ENCOUNTER — APPOINTMENT (OUTPATIENT)
Dept: CARDIOLOGY | Facility: HOSPITAL | Age: 70
End: 2025-05-01
Payer: MEDICARE

## 2025-05-08 ENCOUNTER — TELEMEDICINE (OUTPATIENT)
Dept: PRIMARY CARE | Facility: CLINIC | Age: 70
End: 2025-05-08
Payer: MEDICARE

## 2025-05-08 DIAGNOSIS — J01.90 ACUTE SINUSITIS WITH SYMPTOMS > 10 DAYS: Primary | ICD-10-CM

## 2025-05-08 PROCEDURE — 1036F TOBACCO NON-USER: CPT | Performed by: NURSE PRACTITIONER

## 2025-05-08 PROCEDURE — 1160F RVW MEDS BY RX/DR IN RCRD: CPT | Performed by: NURSE PRACTITIONER

## 2025-05-08 PROCEDURE — 99213 OFFICE O/P EST LOW 20 MIN: CPT | Performed by: NURSE PRACTITIONER

## 2025-05-08 PROCEDURE — 1159F MED LIST DOCD IN RCRD: CPT | Performed by: NURSE PRACTITIONER

## 2025-05-08 RX ORDER — FLUTICASONE PROPIONATE 50 MCG
2 SPRAY, SUSPENSION (ML) NASAL DAILY
Qty: 16 G | Refills: 0 | Status: SHIPPED | OUTPATIENT
Start: 2025-05-08 | End: 2026-05-08

## 2025-05-08 RX ORDER — AMOXICILLIN AND CLAVULANATE POTASSIUM 875; 125 MG/1; MG/1
875 TABLET, FILM COATED ORAL 2 TIMES DAILY
Qty: 20 TABLET | Refills: 0 | Status: SHIPPED | OUTPATIENT
Start: 2025-05-08 | End: 2025-05-18

## 2025-05-08 ASSESSMENT — ENCOUNTER SYMPTOMS
HEADACHES: 0
NAUSEA: 0
VOMITING: 0
FEVER: 0
WHEEZING: 0
DIARRHEA: 0
SINUS PAIN: 1
COUGH: 0
SINUS PRESSURE: 1
CHILLS: 0
APPETITE CHANGE: 0
SHORTNESS OF BREATH: 0
CONSTIPATION: 0
MYALGIAS: 0
ABDOMINAL PAIN: 0
SORE THROAT: 0
FATIGUE: 0
CHEST TIGHTNESS: 0

## 2025-05-08 ASSESSMENT — LIFESTYLE VARIABLES: HISTORY_OF_SMOKING: I HAVE NEVER SMOKED

## 2025-05-08 NOTE — PROGRESS NOTES
Patient says that her symptoms started about two or three months ago. Patient says that she congestion, sinus pressure. Patient has green nasal drainage. Pt tried zyrtec and it did not help. No chest pain or difficulty breathing. Pt has a history of seasonal allergies but this doesn't feel like that.         Review of Systems   Constitutional:  Negative for appetite change, chills, fatigue and fever.   HENT:  Positive for congestion, postnasal drip, sinus pressure, sinus pain and sneezing. Negative for sore throat.    Respiratory:  Negative for cough, chest tightness, shortness of breath and wheezing.    Cardiovascular:  Negative for chest pain.   Gastrointestinal:  Negative for abdominal pain, constipation, diarrhea, nausea and vomiting.   Musculoskeletal:  Negative for myalgias.   Neurological:  Negative for headaches.     Objective   There were no vitals taken for this visit.    Physical Exam  Constitutional:       Appearance: Normal appearance.   Pulmonary:      Effort: Pulmonary effort is normal.   Neurological:      Mental Status: She is alert.   Psychiatric:         Mood and Affect: Mood normal.     Physical exam limited due to the nature of the visit    Assessment/Plan   Problem List Items Addressed This Visit    None  Visit Diagnoses         Codes      Acute sinusitis with symptoms > 10 days    -  Primary J01.90    Relevant Medications    amoxicillin-clavulanate (Augmentin) 875-125 mg tablet    fluticasone (Flonase) 50 mcg/actuation nasal spray        Virtual or Telephone Consent    An interactive audio and video telecommunication system which permits real time communications between the patient (at the originating site) and provider (at the distant site) was utilized to provide this telehealth service.   Verbal consent was requested and obtained from Emeli Mae on this date, 05/08/25 for a telehealth visit and the patient's location was confirmed at the time of the visit. The patient confirmed that  she is in the [facility].     Approximately 15 minutes spent performing virtual video visit.     Pt started on augmentin and flonase. Advised patient on use of humidifier and hot steam treatments. Discussed that patient is to drink plenty of fluids and stay well hydrated. Discussed that patient is to go to the ER for any chest pain, difficulty breathing, shortness of breath or new/concerning symptoms; she agreed. Pt to follow up in person in 2-3 days if symptoms persist despite the use of the medications.

## 2025-05-12 ENCOUNTER — TELEPHONE (OUTPATIENT)
Dept: PRIMARY CARE | Facility: CLINIC | Age: 70
End: 2025-05-12
Payer: MEDICARE

## 2025-05-12 DIAGNOSIS — J01.90 ACUTE SINUSITIS WITH SYMPTOMS > 10 DAYS: Primary | ICD-10-CM

## 2025-05-12 RX ORDER — AMOXICILLIN AND CLAVULANATE POTASSIUM 400; 57 MG/5ML; MG/5ML
875 POWDER, FOR SUSPENSION ORAL 2 TIMES DAILY
Qty: 218 ML | Refills: 0 | Status: SHIPPED | OUTPATIENT
Start: 2025-05-12 | End: 2025-05-22

## 2025-05-12 NOTE — TELEPHONE ENCOUNTER
Spoke to patient and will send in liquid augmentin for her to start. Pt advised to follow up as needed for any questions or concerns

## 2025-06-06 ENCOUNTER — APPOINTMENT (OUTPATIENT)
Dept: PRIMARY CARE | Facility: CLINIC | Age: 70
End: 2025-06-06
Payer: MEDICARE

## 2025-06-06 ENCOUNTER — HOSPITAL ENCOUNTER (OUTPATIENT)
Dept: CARDIOLOGY | Facility: HOSPITAL | Age: 70
Discharge: HOME | End: 2025-06-06
Payer: MEDICARE

## 2025-06-06 ENCOUNTER — DOCUMENTATION (OUTPATIENT)
Dept: OPHTHALMOLOGY | Facility: CLINIC | Age: 70
End: 2025-06-06

## 2025-06-06 DIAGNOSIS — I48.0 PAROXYSMAL ATRIAL FIBRILLATION (MULTI): ICD-10-CM

## 2025-06-06 LAB
AORTIC VALVE PEAK VELOCITY: 1.68 M/S
AV PEAK GRADIENT: 11 MMHG
AVA (PEAK VEL): 0.89 CM2
EJECTION FRACTION APICAL 4 CHAMBER: 65.1
EJECTION FRACTION: 72 %
LEFT ATRIUM VOLUME AREA LENGTH INDEX BSA: 22.5 ML/M2
LEFT VENTRICLE INTERNAL DIMENSION DIASTOLE: 4.25 CM (ref 3.5–6)
LEFT VENTRICULAR OUTFLOW TRACT DIAMETER: 1.27 CM
LV EJECTION FRACTION BIPLANE: 72 %
MITRAL VALVE E/A RATIO: 0.96
RIGHT VENTRICLE FREE WALL PEAK S': 17.49 CM/S
RIGHT VENTRICLE PEAK SYSTOLIC PRESSURE: 28 MMHG
TRICUSPID ANNULAR PLANE SYSTOLIC EXCURSION: 2.2 CM

## 2025-06-06 PROCEDURE — 93306 TTE W/DOPPLER COMPLETE: CPT

## 2025-06-06 PROCEDURE — 93306 TTE W/DOPPLER COMPLETE: CPT | Performed by: INTERNAL MEDICINE

## 2025-06-06 RX ORDER — RIVAROXABAN 20 MG/1
20 TABLET, FILM COATED ORAL DAILY
Qty: 90 TABLET | Refills: 3 | Status: SHIPPED | OUTPATIENT
Start: 2025-06-06

## 2025-06-06 ASSESSMENT — REFRACTION_CURRENTRX
OD_BRAND: BIOFINITY TORIC
OD_CYLINDER: -0.75
OD_BASECURVE: 8.7
OD_SPHERE: -2.75
OD_AXIS: 180
OD_DIAMETER: 14.5

## 2025-06-08 NOTE — PROGRESS NOTES
Chief Complaint:   No chief complaint on file.     History Of Present Illness:    Emeli Mae is a 69 y.o. female presenting with palpitations, paroxysmal atrial fibrillation.    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 She also has a history of atrial tachycardia, and paroxysmal atrial fibrillation that seems to occur in the setting of hypokalemia. She has a [id number] score of 2 placing her at high risk for stroke and thromboembolism, Her prior stress echo in February 2019 was negative for ischemia by wall motion criteria at 8.5 METS. She has seen an electrophysiologist, Dr. Osvaldo Ruggiero at [facility] most recently in January 2022, at which time continued anticoagulation and conservative management were recommended.     Two weeks ago, while visiting family in [place] under stressful circumstances, she experienced palpitations in the middle of the night.  She felt shaky, and noticed her heart was beating irregularly.  These symptoms lasted for about a week before resolving.  She did not seek medical attention at the time.  The patient denies chest discomfort, dyspnea,  orthopnea, PND, syncope, and near syncope.    The patient's monitor study done April 24-May 23, 2025. disclosed occasional PACs with a total of 7 episodes of SVT recorded during the 30 days of monitoring, the longest being 7 minutes and 16 seconds in duration. There was no evidence of PAF.   Rare PVCs were recorded. During the 30 days of monitoring, a total of 5 episodes of nonsustained ventricular tachycardia were recorded, the longest being 8 seconds in duration. The patient's echocardiogram dated 6/6/2025  revealed an EF of 72%, without significant valvular or pericardial disease.    She was consuming 1-2 coffees poer day, but stopped.  Denies alcohol.           Last Recorded Vitals:  There were no vitals filed for this visit.      Past Medical History:  She has a  past medical history of Other diseases of the blood and blood-forming organs and certain disorders involving the immune mechanism complicating pregnancy, unspecified trimester ([facility] (07/09/2021).    Past Surgical History:  She has no past surgical history on file.      Social History:  She reports that she has never smoked. She has never used smokeless tobacco. She reports that she does not currently use alcohol. She reports that she does not use drugs.    Family History:  Family History   Problem Relation Name Age of Onset    No Known Problems Mother      No Known Problems Father      Hypertrophic cardiomyopathy Child son     Other (ICD) Child son         Allergies:  Patient has no known allergies.    Outpatient Medications:  Current Outpatient Medications   Medication Instructions    BACILLUS COAGULANS-INULIN ORAL 1 capsule, Daily    cholecalciferol (VITAMIN D-3) 1,000 Units, Daily RT    fluticasone (Flonase) 50 mcg/actuation nasal spray 2 sprays, Each Nostril, Daily, Shake gently. Before first use, prime pump. After use, clean tip and replace cap.    metoprolol succinate XL (TOPROL-XL) 25 mg, oral, Daily    multivitamin,tx-iron-minerals (Complete Multivitamin) tablet Take by mouth.    olopatadine (Pataday) 0.2 % ophthalmic solution 1 drop, Both Eyes, Daily    potassium chloride CR 10 mEq ER tablet 10 mEq, oral, Daily, Take with food.    triamcinolone (Kenalog) 0.1 % ointment Apply a thin film topically to affected skin areas twice daily as needed    Xarelto 20 mg, oral, Daily       Physical Exam:  GENERAL:  pleasant 69 year-old  HEENT: No xanthelasma  NECK: Supple, no palpable adenopathy or thyromegaly  CHEST: Clear to auscultation, respiratory effort unlabored  CARDIAC: RRR, normal S1 and S2, no audible murmur, rub, gallop, carotids are brisk, PMI is not displaced  ABD: Active bowel sounds, nontender, no organomegaly, no evidence of ascites  EXT: No clubbing, cyanosis, edema, or tenderness  NEURO: Awake,  "alert, appropriate, speech is fluent       Last Labs:  CBC -  Lab Results   Component Value Date    WBC 6.4 07/26/2024    HGB 13.7 07/26/2024    HCT 42.2 07/26/2024    MCV 86 07/26/2024     07/26/2024       CMP -  Lab Results   Component Value Date    CALCIUM 9.4 04/24/2025    PROT 6.9 07/26/2024    ALBUMIN 4.5 07/26/2024    AST 23 07/26/2024    ALT 21 07/26/2024    ALKPHOS 75 07/26/2024    BILITOT 0.5 07/26/2024       LIPID PANEL -   No results found for: \"CHOL\", \"TRIG\", \"HDL\", \"CHHDL\", \"LDLF\", \"VLDL\", \"NHDL\"    RENAL FUNCTION PANEL -   Lab Results   Component Value Date    GLUCOSE 89 04/24/2025     04/24/2025    K 4.3 04/24/2025     04/24/2025    CO2 29 04/24/2025    ANIONGAP 7 04/24/2025    BUN 17 04/24/2025    CREATININE 0.69 04/24/2025    CALCIUM 9.4 04/24/2025    ALBUMIN 4.5 07/26/2024        Lab Results   Component Value Date    BNP 64 05/17/2023    BNP CANCELED 05/17/2023         Lab review: I have personally reviewed the laboratory result(s) TSH and Chemistry BMP     Lab Results   Component Value Date    GLUCOSE 89 04/24/2025    CALCIUM 9.4 04/24/2025    CO2 29 04/24/2025    CREATININE 0.69 04/24/2025     Diagnostic review: I have independently interpreted the Echocardiogram and Telemetry .  My findings are  as summarized in the HPI..      Assessment/Plan   {Assess/Plan SmartLinks:45617}        Lj Curtis MD  " In Cardiology; Future    Referral to Cardiac Electrophysiology; Future    Nonsustained ventricular tachycardia (Multi)    Orders:    Follow Up In Cardiology; Future    Referral to Cardiac Electrophysiology; Future            Lj Curtis MD

## 2025-06-09 ENCOUNTER — APPOINTMENT (OUTPATIENT)
Dept: CARDIOLOGY | Facility: CLINIC | Age: 70
End: 2025-06-09
Payer: MEDICARE

## 2025-06-09 VITALS
OXYGEN SATURATION: 98 % | DIASTOLIC BLOOD PRESSURE: 64 MMHG | BODY MASS INDEX: 19.38 KG/M2 | WEIGHT: 109.4 LBS | HEIGHT: 63 IN | SYSTOLIC BLOOD PRESSURE: 114 MMHG | HEART RATE: 68 BPM

## 2025-06-09 DIAGNOSIS — I47.29 NONSUSTAINED VENTRICULAR TACHYCARDIA (MULTI): ICD-10-CM

## 2025-06-09 DIAGNOSIS — I48.0 PAROXYSMAL ATRIAL FIBRILLATION (MULTI): ICD-10-CM

## 2025-06-09 DIAGNOSIS — R00.2 PALPITATIONS: ICD-10-CM

## 2025-06-09 DIAGNOSIS — I47.10 SUPRAVENTRICULAR TACHYCARDIA, UNSPECIFIED: Primary | ICD-10-CM

## 2025-06-09 PROCEDURE — 1159F MED LIST DOCD IN RCRD: CPT | Performed by: INTERNAL MEDICINE

## 2025-06-09 PROCEDURE — 1160F RVW MEDS BY RX/DR IN RCRD: CPT | Performed by: INTERNAL MEDICINE

## 2025-06-09 PROCEDURE — 99215 OFFICE O/P EST HI 40 MIN: CPT | Performed by: INTERNAL MEDICINE

## 2025-06-09 PROCEDURE — 1036F TOBACCO NON-USER: CPT | Performed by: INTERNAL MEDICINE

## 2025-06-09 PROCEDURE — 3008F BODY MASS INDEX DOCD: CPT | Performed by: INTERNAL MEDICINE

## 2025-06-09 PROCEDURE — G2211 COMPLEX E/M VISIT ADD ON: HCPCS | Performed by: INTERNAL MEDICINE

## 2025-06-09 RX ORDER — CALCIUM CARBONATE/VITAMIN D3 500-10/5ML
1 LIQUID (ML) ORAL DAILY
Qty: 90 CAPSULE | Refills: 3 | Status: SHIPPED | OUTPATIENT
Start: 2025-06-09

## 2025-06-09 NOTE — ASSESSMENT & PLAN NOTE
Orders:    magnesium oxide 400 mg magnesium capsule; Take 1 capsule (400 mg) by mouth once daily.    Follow Up In Cardiology; Future    Referral to Cardiac Electrophysiology; Future

## 2025-06-09 NOTE — ASSESSMENT & PLAN NOTE
Orders:    Follow Up In Cardiology    Follow Up In Cardiology; Future    rivaroxaban (Xarelto) 20 mg tablet; Take 1 tablet (20 mg) by mouth once daily.

## 2025-06-13 ENCOUNTER — APPOINTMENT (OUTPATIENT)
Dept: PRIMARY CARE | Facility: CLINIC | Age: 70
End: 2025-06-13
Payer: MEDICARE

## 2025-06-24 ENCOUNTER — APPOINTMENT (OUTPATIENT)
Dept: CARDIOLOGY | Facility: CLINIC | Age: 70
End: 2025-06-24
Payer: MEDICARE

## 2025-07-08 PROBLEM — H25.13 AGE-RELATED NUCLEAR CATARACT OF BOTH EYES: Status: ACTIVE | Noted: 2024-02-07

## 2025-07-24 ENCOUNTER — APPOINTMENT (OUTPATIENT)
Dept: CARDIOLOGY | Facility: CLINIC | Age: 70
End: 2025-07-24
Payer: MEDICARE

## 2025-07-25 ENCOUNTER — APPOINTMENT (OUTPATIENT)
Dept: CARDIOLOGY | Facility: CLINIC | Age: 70
End: 2025-07-25
Payer: MEDICARE

## 2025-08-11 ENCOUNTER — APPOINTMENT (OUTPATIENT)
Dept: DERMATOLOGY | Facility: CLINIC | Age: 70
End: 2025-08-11
Payer: MEDICARE

## 2025-08-15 DIAGNOSIS — I48.0 PAROXYSMAL ATRIAL FIBRILLATION (MULTI): ICD-10-CM

## 2025-08-15 RX ORDER — METOPROLOL SUCCINATE 25 MG/1
25 TABLET, EXTENDED RELEASE ORAL DAILY
Qty: 90 TABLET | Refills: 3 | Status: SHIPPED | OUTPATIENT
Start: 2025-08-15

## 2025-09-18 ENCOUNTER — APPOINTMENT (OUTPATIENT)
Dept: PRIMARY CARE | Facility: CLINIC | Age: 70
End: 2025-09-18
Payer: MEDICARE

## 2025-11-25 ENCOUNTER — APPOINTMENT (OUTPATIENT)
Dept: CARDIOLOGY | Facility: CLINIC | Age: 70
End: 2025-11-25
Payer: MEDICARE

## 2026-04-30 ENCOUNTER — APPOINTMENT (OUTPATIENT)
Dept: OPHTHALMOLOGY | Facility: CLINIC | Age: 71
End: 2026-04-30
Payer: MEDICARE

## 2026-06-09 ENCOUNTER — APPOINTMENT (OUTPATIENT)
Dept: CARDIOLOGY | Facility: CLINIC | Age: 71
End: 2026-06-09
Payer: MEDICARE